# Patient Record
Sex: FEMALE | Race: WHITE | NOT HISPANIC OR LATINO | Employment: PART TIME | URBAN - METROPOLITAN AREA
[De-identification: names, ages, dates, MRNs, and addresses within clinical notes are randomized per-mention and may not be internally consistent; named-entity substitution may affect disease eponyms.]

---
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
## 2020-03-17 ENCOUNTER — HOSPITAL ENCOUNTER (EMERGENCY)
Facility: HOSPITAL | Age: 20
Discharge: HOME | End: 2020-03-17
Attending: EMERGENCY MEDICINE
Payer: COMMERCIAL

## 2020-03-17 VITALS
HEART RATE: 106 BPM | TEMPERATURE: 100 F | RESPIRATION RATE: 22 BRPM | SYSTOLIC BLOOD PRESSURE: 140 MMHG | BODY MASS INDEX: 44.16 KG/M2 | WEIGHT: 240 LBS | DIASTOLIC BLOOD PRESSURE: 82 MMHG | HEIGHT: 62 IN | OXYGEN SATURATION: 96 %

## 2020-03-17 DIAGNOSIS — J06.9 UPPER RESPIRATORY TRACT INFECTION, UNSPECIFIED TYPE: Primary | ICD-10-CM

## 2020-03-17 DIAGNOSIS — R05.9 COUGH: ICD-10-CM

## 2020-03-17 LAB
B-HCG UR QL: NEGATIVE
CTP QC/QA: YES
DEPRECATED S PYO AG THROAT QL EIA: NEGATIVE
INFLUENZA A, MOLECULAR: NEGATIVE
INFLUENZA B, MOLECULAR: NEGATIVE
SPECIMEN SOURCE: NORMAL

## 2020-03-17 PROCEDURE — 87635 SARS-COV-2 COVID-19 AMP PRB: CPT

## 2020-03-17 PROCEDURE — 99284 EMERGENCY DEPT VISIT MOD MDM: CPT | Mod: 25

## 2020-03-17 PROCEDURE — 87081 CULTURE SCREEN ONLY: CPT

## 2020-03-17 PROCEDURE — 36415 COLL VENOUS BLD VENIPUNCTURE: CPT

## 2020-03-17 PROCEDURE — 81025 URINE PREGNANCY TEST: CPT | Performed by: EMERGENCY MEDICINE

## 2020-03-17 PROCEDURE — 30000890 LABCORP MISCELLANEOUS TEST

## 2020-03-17 PROCEDURE — 87502 INFLUENZA DNA AMP PROBE: CPT

## 2020-03-17 PROCEDURE — 87880 STREP A ASSAY W/OPTIC: CPT

## 2020-03-18 NOTE — DISCHARGE INSTRUCTIONS
At this time it is important to self quarantine at home and to call the [facility] Department of Health at [phone] or text MARLI to [phone] for further advice on when quarantine may be lifted.  You may talk to a doctor by virtual visit by going to [email or web address] or call your primary care doctor for further advice.  You should return to the ER immediately if you have difficulty breathing, have any worsening symptoms or any concerns.    Places for Testing                [facility] [place] [address], [address]  [place] [id number]    [facility]  [address] [place] [address]    [facility] [place]  [address]  [place] [address]

## 2020-03-18 NOTE — ED PROVIDER NOTES
Encounter Date: 3/17/2020       History     Chief Complaint   Patient presents with    Fever    Cough     19-year-old female presents with fever and a cough patient reports she has had these symptoms for the past day and a half, patient reports dry cough patient denies shortness of breath patient denies chest pain patient denies nausea or vomiting patient has no other acute complaints at this time        Review of patient's allergies indicates:  No Known Allergies  No past medical history on file.  No past surgical history on file.  Family History   Problem Relation Age of Onset    Heart disease Father     Other Maternal Grandmother         vertigo     Social History     Tobacco Use    Smoking status: Passive Smoke Exposure - Never Smoker    Smokeless tobacco: Never Used    Tobacco comment: she uses e-cig.   Substance Use Topics    Alcohol use: Not on file    Drug use: Not on file     Review of Systems   Constitutional: Positive for fever.   HENT: Negative for congestion, rhinorrhea, sore throat and trouble swallowing.    Eyes: Negative for visual disturbance.   Respiratory: Positive for cough. Negative for chest tightness, shortness of breath and wheezing.    Cardiovascular: Negative for chest pain, palpitations and leg swelling.   Gastrointestinal: Negative for abdominal distention, abdominal pain, constipation, diarrhea, nausea and vomiting.   Genitourinary: Negative for difficulty urinating, dysuria, flank pain and frequency.   Musculoskeletal: Negative for arthralgias, back pain, joint swelling and neck pain.   Skin: Negative for color change and rash.   Neurological: Negative for dizziness, syncope, speech difficulty, weakness, numbness and headaches.   All other systems reviewed and are negative.      Physical Exam     Initial Vitals [03/17/20 1918]   BP Pulse Resp Temp SpO2   (!) 140/82 106 (!) 22 99.8 °F (37.7 °C) 96 %      MAP       --         Physical Exam    Nursing note and vitals  reviewed.  Constitutional: She appears well-developed and well-nourished. She is not diaphoretic. No distress.   HENT:   Head: Normocephalic and atraumatic.   Right Ear: External ear normal.   Left Ear: External ear normal.   Nose: Nose normal.   Mouth/Throat: Oropharynx is clear and moist. No oropharyngeal exudate.   Eyes: Conjunctivae and EOM are normal. Pupils are equal, round, and reactive to light. Right eye exhibits no discharge. Left eye exhibits no discharge. No scleral icterus.   Neck: Normal range of motion. Neck supple. No thyromegaly present. No tracheal deviation present. No JVD present.   Cardiovascular: Regular rhythm, normal heart sounds and intact distal pulses. Exam reveals no gallop and no friction rub.    No murmur heard.  Tachycardia   Pulmonary/Chest: Breath sounds normal. No stridor. No respiratory distress. She has no wheezes. She has no rhonchi. She has no rales. She exhibits no tenderness.   Abdominal: Soft. Bowel sounds are normal. She exhibits no distension and no mass. There is no tenderness. There is no rebound and no guarding.   Musculoskeletal: Normal range of motion. She exhibits no edema or tenderness.   Lymphadenopathy:     She has no cervical adenopathy.   Neurological: She is alert and oriented to person, place, and time. She has normal strength. She displays normal reflexes. No cranial nerve deficit or sensory deficit.   Skin: Skin is warm and dry. No rash and no abscess noted. No erythema. No pallor.         ED Course   Procedures  Labs Reviewed   THROAT SCREEN, RAPID   CULTURE, STREP A,  THROAT   INFLUENZA A AND B ANTIGEN    Narrative:     Specimen Source->Nasopharyngeal Swab   [facility] MISCELLANEOUS TEST    Narrative:     COVID19  Source:->Nasal Wash  Name of Test->COVID19   POCT URINE PREGNANCY          Imaging Results          X-Ray Chest PA And Lateral (Final result)  Result time 03/17/20 19:47:45    Final result by Elodia Weems MD (03/17/20 19:47:45)                  Narrative:    PROCEDURE:   XR CHEST PA AND LATERAL  dated  3/17/2020 7:42 PM    CLINICAL HISTORY:   Female 19 years of age.   Cough and Fever    TECHNIQUE:  Frontal and lateral views of the chest were obtained.    PREVIOUS STUDIES:  None Available    FINDINGS:    Cardiac and mediastinal contours are normal. There is no pulmonary  infiltrate. A few small calcified densities project over the left  midlung. The largest is 4 mm in size. There is no pleural effusion or  pneumothorax.  Bones are unremarkable.    IMPRESSION:    No acute findings. Clear lungs, other than probable calcified  granulomas in the left midlung.    Electronically Signed by Elodia Weems on 3/17/2020 7:55 PM                               Medical Decision Making:   History:   Old Medical Records: I decided to obtain old medical records.  Initial Assessment:   Emergent evaluation of a 19-year-old female presenting with cough and fever will obtain swab for novel corona virus, patient's influenza screen is negative chest x-ray is within normal limits patient is advised to home quarantine while she awaits the results of this test.  Patient is advised to return immediately for shortness of breath or any further concerns otherwise she is to follow the health department guidelines regarding self isolation.  Patient appears nontoxic at this time.                                 Clinical Impression:       ICD-10-CM ICD-9-CM   1. Upper respiratory tract infection, unspecified type J06.9 465.9   2. Cough R05 786.2             ED Disposition Condition    Discharge Stable        ED Prescriptions     None        Follow-up Information     Follow up With Specialties Details Why Contact Info Additional Information    Radha Smith MD Pediatrics Schedule an appointment as soon as possible for a visit in 7 days  [address] [address]  [phone]       [facility] Emergency Medicine  If symptoms worsen [address]  [address]  [phone] 1st floor                                     Hernán Knight MD  03/18/20 0359

## 2020-03-19 LAB — BACTERIA THROAT CULT: NORMAL

## 2020-03-23 ENCOUNTER — PATIENT MESSAGE (OUTPATIENT)
Dept: PEDIATRICS | Facility: CLINIC | Age: 20
End: 2020-03-23

## 2020-03-28 ENCOUNTER — TELEPHONE (OUTPATIENT)
Dept: ADMINISTRATIVE | Facility: HOSPITAL | Age: 20
End: 2020-03-28

## 2020-03-28 LAB
LABCORP MISC TEST CODE: NORMAL
LABCORP MISC TEST NAME: NORMAL
LABCORP MISCELLANEOUS TEST: NORMAL

## 2020-03-28 NOTE — TELEPHONE ENCOUNTER
Instructed patient of NEGATIVE COVID-19 (coronavirus) test result. Further instructed patient, if asymptomatic, self quarantine may be discontinued, however it is recommended to stay home, avoid large crowds and practice proper handwashing. If still symptomatic, instructed patient they may have another respiratory pathogen that is circulating in the community, to avoid work, school and group settings until TWO days following the last day of respiratory symptoms/fever. Instructed to follow up with primary care physician or return to ER if symptoms worsen. Obtained home e-mail address to send a copy of printed instructions.     This result was communicated to the patient by Irene Soria RN on 03/28/2020.

## 2021-05-12 ENCOUNTER — PATIENT MESSAGE (OUTPATIENT)
Dept: RESEARCH | Facility: HOSPITAL | Age: 21
End: 2021-05-12

## 2022-04-05 PROBLEM — O99.212 OBESITY AFFECTING PREGNANCY IN SECOND TRIMESTER: Status: ACTIVE | Noted: 2022-04-05
